# Patient Record
Sex: FEMALE
[De-identification: names, ages, dates, MRNs, and addresses within clinical notes are randomized per-mention and may not be internally consistent; named-entity substitution may affect disease eponyms.]

---

## 2022-04-15 ENCOUNTER — APPOINTMENT (OUTPATIENT)
Dept: ORTHOPEDIC SURGERY | Facility: CLINIC | Age: 37
End: 2022-04-15
Payer: SELF-PAY

## 2022-04-15 DIAGNOSIS — M75.51 BURSITIS OF RIGHT SHOULDER: ICD-10-CM

## 2022-04-15 DIAGNOSIS — Z78.9 OTHER SPECIFIED HEALTH STATUS: ICD-10-CM

## 2022-04-15 DIAGNOSIS — M25.511 PAIN IN RIGHT SHOULDER: ICD-10-CM

## 2022-04-15 PROBLEM — Z00.00 ENCOUNTER FOR PREVENTIVE HEALTH EXAMINATION: Status: ACTIVE | Noted: 2022-04-15

## 2022-04-15 PROCEDURE — 99203 OFFICE O/P NEW LOW 30 MIN: CPT

## 2022-04-15 PROCEDURE — 73030 X-RAY EXAM OF SHOULDER: CPT | Mod: RT

## 2022-04-15 NOTE — IMAGING
[de-identified] : RIGHT SHOULDER EXAM\par \par +ttp anterorlateral, +impingement, +empty can\par Skin intact\par No muscle atrophy noted\par Non-tender\par Shoulder ROM\par   Forward flexion to 160°; contralateral shoulder 160°\par   Abduction to 160°\par   ER with elbow at side to 45°; contralateral shoulder 45°\par   IR to L1\par \par - Speed's test\par - Apprehension test\par - Lift-off test\par - Cross-body adduction test\par \par Rotator cuff strength is 5/5 throughout\par Hand is warm and well perfused with brisk capillary refill throughout\par Motor function intact to axillary, AIN, PIN, and ulnar nerves\par Sensation intact axillary, median, ulnar, and superficial radial nerves\par Elbow and wrist motion intact and full\par Patient able to make a composite fist\par

## 2022-04-15 NOTE — ASSESSMENT
[FreeTextEntry1] : Subacromial Impingement\par The diagnosis and treatment options were discussed at length with the patient.  The pathophysiology of shoulder impingement syndrome was discussed, including bursitis in the subacromial space causing rotator cuff inflammation and pain with overhead activities.  Treatment options discussed including observation, activity modifications, oral anti-inflammatories, physical therapy, steroid injection, and advanced imaging. All of the patient's questions were answered to their satisfaction.  Plan for follow-up in 6 weeks time, and should the symptoms ivonne, the patient may cancel.\par \par \par F/u 2mo

## 2022-04-15 NOTE — HISTORY OF PRESENT ILLNESS
[de-identified] : 36F, LHD, No PMHX presents with right shoulder pain for approx 10 days. Denies injury/trauma. Admits came on suddenly. Admits to going to the gym - feels may be making it worse. Taking NSAIDs w/o relief. Denies numbness/tingling.

## 2022-06-17 ENCOUNTER — APPOINTMENT (OUTPATIENT)
Dept: ORTHOPEDIC SURGERY | Facility: CLINIC | Age: 37
End: 2022-06-17

## 2025-07-02 ENCOUNTER — NON-APPOINTMENT (OUTPATIENT)
Age: 40
End: 2025-07-02